# Patient Record
Sex: MALE | Race: WHITE | NOT HISPANIC OR LATINO
[De-identification: names, ages, dates, MRNs, and addresses within clinical notes are randomized per-mention and may not be internally consistent; named-entity substitution may affect disease eponyms.]

---

## 2020-08-04 ENCOUNTER — TRANSCRIPTION ENCOUNTER (OUTPATIENT)
Age: 55
End: 2020-08-04

## 2021-03-31 ENCOUNTER — OUTPATIENT (OUTPATIENT)
Dept: OUTPATIENT SERVICES | Facility: HOSPITAL | Age: 56
LOS: 1 days | End: 2021-03-31
Payer: COMMERCIAL

## 2021-03-31 ENCOUNTER — APPOINTMENT (OUTPATIENT)
Dept: ORTHOPEDIC SURGERY | Facility: CLINIC | Age: 56
End: 2021-03-31
Payer: COMMERCIAL

## 2021-03-31 ENCOUNTER — RESULT REVIEW (OUTPATIENT)
Age: 56
End: 2021-03-31

## 2021-03-31 VITALS — WEIGHT: 205 LBS | HEIGHT: 70 IN | BODY MASS INDEX: 29.35 KG/M2

## 2021-03-31 DIAGNOSIS — M75.01 ADHESIVE CAPSULITIS OF RIGHT SHOULDER: ICD-10-CM

## 2021-03-31 PROCEDURE — 73030 X-RAY EXAM OF SHOULDER: CPT | Mod: 26,RT

## 2021-03-31 PROCEDURE — 73030 X-RAY EXAM OF SHOULDER: CPT

## 2021-03-31 PROCEDURE — 99203 OFFICE O/P NEW LOW 30 MIN: CPT

## 2021-03-31 PROCEDURE — 99072 ADDL SUPL MATRL&STAF TM PHE: CPT

## 2021-07-12 ENCOUNTER — TRANSCRIPTION ENCOUNTER (OUTPATIENT)
Age: 56
End: 2021-07-12

## 2023-12-14 ENCOUNTER — APPOINTMENT (OUTPATIENT)
Dept: OTOLARYNGOLOGY | Facility: CLINIC | Age: 58
End: 2023-12-14
Payer: COMMERCIAL

## 2023-12-14 VITALS
WEIGHT: 194 LBS | OXYGEN SATURATION: 98 % | DIASTOLIC BLOOD PRESSURE: 85 MMHG | BODY MASS INDEX: 27.77 KG/M2 | TEMPERATURE: 97.7 F | SYSTOLIC BLOOD PRESSURE: 128 MMHG | HEART RATE: 68 BPM | HEIGHT: 70 IN

## 2023-12-14 DIAGNOSIS — H61.22 IMPACTED CERUMEN, LEFT EAR: ICD-10-CM

## 2023-12-14 PROCEDURE — 99203 OFFICE O/P NEW LOW 30 MIN: CPT

## 2023-12-14 NOTE — ASSESSMENT
[FreeTextEntry1] : - 58-year-old gentleman who presents with concern for recent left-sided ear blocking.  Advised that he had cerumen by PCP and use Debrox and was able to clear this.  Feels back to baseline.  On exam today ear appears normal.  Again patient is back to baseline and is asymptomatic.  At this time I am recommending Debrox.  And follow-up as needed.  If there is persistent clogging sensation can follow-up for repeat evaluation.  -Debrox as needed -Follow-up as needed

## 2023-12-14 NOTE — HISTORY OF PRESENT ILLNESS
[de-identified] : 12/14/23 59 yo male who presents with concern for cerumen impaction and left ear blockage.  Last week patient noted that his ear was blocked.  He had some slight muffled/decreased hearing.  Denied any tinnitus, vertiginous symptoms, otorrhea or otalgia.  Seen by PCP and told that he has left-sided cerumen.  Patient used Debrox at home and was able to clear the cerumen.  He noted improvement in terms of symptoms.  He feels back to baseline.  Presents for evaluation to ensure no further cerumen.  No ear, nose, throat symptoms otherwise.